# Patient Record
Sex: FEMALE | Race: WHITE | NOT HISPANIC OR LATINO | Employment: UNEMPLOYED | URBAN - METROPOLITAN AREA
[De-identification: names, ages, dates, MRNs, and addresses within clinical notes are randomized per-mention and may not be internally consistent; named-entity substitution may affect disease eponyms.]

---
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
## 2021-03-10 DIAGNOSIS — Z23 ENCOUNTER FOR IMMUNIZATION: ICD-10-CM

## 2021-08-02 ENCOUNTER — EVALUATION (OUTPATIENT)
Dept: PHYSICAL THERAPY | Facility: CLINIC | Age: 21
End: 2021-08-02
Payer: COMMERCIAL

## 2021-08-02 DIAGNOSIS — R53.1 DECREASED STRENGTH: ICD-10-CM

## 2021-08-02 DIAGNOSIS — Z74.09 IMPAIRED FUNCTIONAL MOBILITY, BALANCE, AND ENDURANCE: Primary | ICD-10-CM

## 2021-08-02 PROCEDURE — 97162 PT EVAL MOD COMPLEX 30 MIN: CPT

## 2021-08-02 PROCEDURE — 97112 NEUROMUSCULAR REEDUCATION: CPT

## 2021-08-03 NOTE — PROGRESS NOTES
PT Evaluation     Today's date: 2021  Patient name: Brandon Hernandez  : 2000  MRN: [id number]  Referring provider: Bety Alfredo MD  Dx:   Encounter Diagnosis     ICD-10-CM    1  Impaired functional mobility, balance, and endurance  Z74 09    2  Decreased strength  R53 1                 Daisy's mother answered her Covid Symptom Screening questionnaire  Daisy presented with a temperature WNL according to temporal thermometer  History and Current Information:  Client is a 22year old female who presents to PT due to a primary diagnosis of Down's Syndrome  Daisy previous was receiving skilled physical therapy services on an episode of care basis, but has not been seen in person since prior to the pandemic  Secondary to associated global hypotonia and ligamentous laxity, client has significant instability, balance deficits, impaired efficiency of movement, and impaired endurance  Client wears bilateral custom UCBL orthotics to control foot positioning  Client is s/p bilateral hemiepiphysiodesis with eight plate on distal medial femurs for correction of genu valgum and bilateral medial malleolar screw hemiepiphysiodesis on 7/15/2013  She is also s/p eight plate and screw removal in bilateral knees on 2014  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) and visual deficits which cause her to have double vision when her head is in midline  Client is followed by an optometrist, Dr Javier Marsh  Client is currently employed at [employer], working 6 hour shifts  She will attend [employer] two days per week beginning in the fall  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, working 4 hour shifts  Besides working and attending school, Annabella Hirkatelynn maintains a busy lifestyle  She is currently enrolled in a dance program and also attends the [employer] with her parents 2-3x per week   Daisy's family's goal for physical therapy is for Daisy to have improved endurance and stamina so that she can participate more effectively in her busy work/school schedule  Motor Abilities: Client can walk independently on indoor and outdoor surfaces  She requires increased time on uneven or unstable terrain  Client is independent with bed mobility and transfers  She can navigate stairs independently but does require increased time  Range of Motion:  - UE and LE ROM grossly WNL  - Cervical AROM: R lateral cervical flexion @ 45 degrees, L lateral cervical flexion at 30 degrees (deficit), R cervical rotation at 70 degrees (deficit), L cervical rotation at 100 degrees  - Cervical PROM: R lateral cervical flexion @ 45 degrees, L lateral cervical flexion at 40 degrees (deficit), R cervical rotation at 80 degrees (deficit), L cervical rotation at 100 degrees     Strength: Not formally assessed with MMT due to intellectual disability  - Impaired dorsiflexion strength bilaterally leading to decreased consistency of heel strike and occasionally decreased clearance during swing phase, causing shuffling  - Impaired strength of quadriceps (concentric and eccentric)   - Frequent knee hyperextension in closed chain positions for stability    - Impaired ability to lower herself down during transitions    - Descending stairs with reciprocal patterning and no HR: 40 seconds (regression from 19 seconds)  - Plank max hold 15 seconds (regression from 40 seconds)    Balance:  - SLS <1 second bilaterally (regresion from L 4 seconds, R 3 seconds)  - Tandem stance max hold R leading 26 seconds, L leading 30 seconds (improved from 20 seconds)   - Excessive lumbar lordosis noted due to decreased core activation  - 4 inch wide balance beam  Unable to cross without step-down (regression from 75% successful reps)    Characteristics of Posture and Movement:   Static Standing: client presents with severe calcaneal valgus with midfoot pronation and navicular head drop   She has a tendency towards a wider LUCAS with slight external rotation to increase stability  She presents with bilateral knee hyperextension and moderate genu valgum  Hips remain slightly flexed with forward trunk posturing leading to increased cervical extension  Client also maintains a 30-40 degree head tilt to the right at all times secondary to continued torticolis  Seated Posture: client prefers ring sitting on the floor  Decreased lumbar lordosis, forward rounded shoulders with cervical spine in excessive extension  Transitions: Client completes floor-to-standing transitions through half-kneel with lead hip abducted and externally rotated to allow for room to place both hands on the ground to assist with transition  Client can technically complete half-kneel to stand without use of UEs, but does not independently choose to do so  Gait Analysis: Client postures with forward trunk positioning and increased cervical extension throughout the gait cycle  She lacks upper trunk rotation leading to muted arm swing  Inconsistent heel strike, with flat foot initial contact often leading to shuffling gait patterning  Increased frequency of knee hyperextension at midstance  Standardized Testing:  Six Minute Walk Test: 438 meters completed (should be 664 (+/-49))    Rios Balance Scale: Total Score 50  Item: Score Comments   Sit to Stand 4    Standing Unsupported 4    Sitting with Back Unsupported but Feet Supported 4    Standing to Sitting 4    Transfers 4    Standing Unsupported with Eyes Closed 4    Standing Unsupported with Feet Together 4    Reaching Forward with Outstretched Arm while Standing 3 8 inch reach    Object from Floor from Standing Position 4    Turning to Look Behind Over Shoulder while Standing 3    Turn 360 Degrees 4    Place Alternate Foot on Step or Stool while Standing 4    Standing Unsupported with One foot in Front 3 R tandem 26 second hold, L tandem 30 second hold   Unable to assume tandem position independently   Stand on One Leg 1 Less than 2 second hold with each LE     Impairments and Participation Limitations:  - Impaired efficiency of movement and gait  - Impaired transitions without use of UEs  - Impaired balance on narrow surfaces  - Decreased strength leading to compensatory strategies, causing increased strain  - Impaired endurance and stamina leading to frequent rest breaks or decreased participation   - Poor posture both statically and dynamically    Summary and Recommendations: Daisy returns to physical therapy today following a 17 month hiatus secondary to the pandemic  While Daisy does maintain a busy schedule, her ability to participate is often limited by decreased endurance and strength  Daisy's poor foot and ankle alignment is adjusted closer to a more neutral position with her UCBLs  Her midfoot is sufficiently supported with the orthotics  However, Daisy's calcaneus remains in a valgus positioning bilaterally  Therapist is recommending that Daisy return to see her orthotist to see if a small amount of medial wedging would help to adjust the alignment of her calcanei to be closer to neutral  Daisy remains at a high risk for medial knee pain and orthopedic damage secondary to increased pressure placed medially due to her foot pronation and the valgus present at her knees  As seen above, Daisy has had some major regressions with regards to balance and stability  Her impaired endurance and strength is affecting her ability to successfully participate in ADLs, work requirements, and community outings  Secondary to these above stated deficits, Daisy will benefit from skilled physical therapy services at a frequency of 1x per week on an episode of care basis  It is imperative that Daisy receives these services, as she is at high risk for further regression secondary to her moderate hypotonia       GOALS:  1  Client will improve maximum plank time to 60 seconds while maintaining proper core and pelvic alignment to indicate appropriate level of strength needed to participate in higher level gross motor activities with proper postural integrity  Current Status: Maintains plank for 15 seconds but struggles to maintain proper spinal alignment  2  Client will demonstrate improved coordination by running 100 yards while dribbling basketball in under 45 seconds  Current Status: Client can walk 100 yards while dribbling basketball without losing control but cannot successfully complete 100 yards while running and dribbling  3  Client will maintain SLS for 8+ seconds bilaterally to indicate improved lateral hip complex strength and improved lateral weight shifting  Current Status: Client can maintain SLS for less than 1 second   4  Client will achieve full active and passive cervical range of motion to assist with maintenance of midline head posturing  Current Status: Client lacks 20 degrees passively and 30 degrees actively with regards to right cervical rotation  5  Client will achieve 600 meters during a Six Minute Walk Test to demonstrate improved gait efficiency and endurance     Current Status: Completes 438 meters

## 2021-08-09 ENCOUNTER — OFFICE VISIT (OUTPATIENT)
Dept: PHYSICAL THERAPY | Facility: CLINIC | Age: 21
End: 2021-08-09
Payer: COMMERCIAL

## 2021-08-09 DIAGNOSIS — R53.1 DECREASED STRENGTH: ICD-10-CM

## 2021-08-09 DIAGNOSIS — Z74.09 IMPAIRED FUNCTIONAL MOBILITY, BALANCE, AND ENDURANCE: Primary | ICD-10-CM

## 2021-08-09 PROCEDURE — 97112 NEUROMUSCULAR REEDUCATION: CPT

## 2021-08-09 PROCEDURE — 97110 THERAPEUTIC EXERCISES: CPT

## 2021-08-10 NOTE — PROGRESS NOTES
Daily Note     Today's date: 2021   Patient name: Judson Cm      : 2000       Age: 22 y o        School/Grade: 10th  MRN: [id number]  Referring provider: Alisson Mc MD  Dx:   Encounter Diagnosis     ICD-10-CM    1  Impaired functional mobility, balance, and endurance  Z74 09    2  Decreased strength  R53 1                 Daisy's mom answered the Covid Symptom Screening questions prior to our session  Daisy presented with a temperature WNL according to temporal thermometer  Hx: Client is a 22year old female who presents to PT due to a primary diagnosis of Down's Syndrome  Secondary to associated global hypotonia and ligamentous laxity, client has significant instability and balance deficits  Client wears bilateral custom UCBL orthotics to control foot positioning  Client is s/p bilateral hemiepiphysiodesis with eight plate on distal medial femurs for correction of genu valgum and bilateral medial malleolar screw hemiepiphysiodesis on 7/15/2013  She is also s/p eight plate and screw removal in bilateral knees on 2014  Client presents with impaired strength and endurance, affecting her successful participation in ADLs, school, and work  Subjective: Daisy arrived for session today accompanied by her mother while wearing her sneakers and UCBLs  Mom reports that Daisy has an appointment at the end of the month with her orthotist to make suggested adjustments to her UCBLs  No new concerns to report at this time       Objective:  - Quadruped kick backs with therapist assistance for proper alignment of LE  Cues to point toes to ground  2x10 reps  - Knee planks 4x20 seconds  - Long sitting dorsiflexion with red theraband 2x10 reps with each LE  - Squats to second step with focus on tall upright posturing and proper alignment of LEs 2x10 reps  - Ascending stairs with hip extension kick back  One handrail  x2 reps Mauricio for alignment  - Half-kneel to standing without use of UEs   x8 attempts with each LE  No successful reps (regression)    Assessment: Therapist provided Daisy and her mother with a written home exercise program, designed to augment our sessions and to focus in on improving strength and stability  Most of our session was spent reviewing exercises with a focus on proper alignment and posture  Secondary to her significant strength impairments throughout her core and hip complexes, Daisy presented with impaired form during many of our exercises today  With the quadruped kick backs, she consistently externally rotated her pelvis and hip, leading to very little gluteus abraham activation  She had the same tendencies during the standing kick-backs on the stairs  While working in the plank position, Daisy struggled to prevent lumbar lordosis, dropping into that position due to significantly impaired core strength  While completing squats on the steps, Daisy compensated for impaired quadriceps and gluteal strength by consistently assuming forward flexion of her trunk, utilizing hip flexors to help her lower down  It is imperative that we continue to address these strength deficits, as Daisy is placing increased strain on her joint complexes by utilizing these compensatory strategies  She is at high risk for injury or pain over time due to her hypotonic nature and ligamentous laxity  Plan: Daisy will continue to benefit from skilled physical therapy on an episodes of care basis to focus on improving strength, endurance, balance, and coordination so that she can have improved participation in ADLs, school, work, and community activities

## 2021-08-16 ENCOUNTER — OFFICE VISIT (OUTPATIENT)
Dept: PHYSICAL THERAPY | Facility: CLINIC | Age: 21
End: 2021-08-16
Payer: COMMERCIAL

## 2021-08-16 DIAGNOSIS — R53.1 DECREASED STRENGTH: ICD-10-CM

## 2021-08-16 DIAGNOSIS — Z74.09 IMPAIRED FUNCTIONAL MOBILITY, BALANCE, AND ENDURANCE: Primary | ICD-10-CM

## 2021-08-16 PROCEDURE — 97110 THERAPEUTIC EXERCISES: CPT

## 2021-08-16 PROCEDURE — 97140 MANUAL THERAPY 1/> REGIONS: CPT

## 2021-08-16 PROCEDURE — 97112 NEUROMUSCULAR REEDUCATION: CPT

## 2021-08-16 NOTE — PROGRESS NOTES
Daily Note     Today's date: 2021   Patient name: Tesha Beal      : 2000       Age: 22 y o        School/Grade: 10th  MRN: [id number]  Referring provider: Faisal Yu MD  Dx:   Encounter Diagnosis     ICD-10-CM    1  Impaired functional mobility, balance, and endurance  Z74 09    2  Decreased strength  R53 1                 Daisy's mom answered the Covid Symptom Screening questions prior to our session  Daisy presented with a temperature WNL according to temporal thermometer  Hx: Client is a 22year old female who presents to PT due to a primary diagnosis of Down's Syndrome  Secondary to associated global hypotonia and ligamentous laxity, client has significant instability and balance deficits  Client wears bilateral custom UCBL orthotics to control foot positioning  Client is s/p bilateral hemiepiphysiodesis with eight plate on distal medial femurs for correction of genu valgum and bilateral medial malleolar screw hemiepiphysiodesis on 7/15/2013  She is also s/p eight plate and screw removal in bilateral knees on 2014  Client presents with impaired strength and endurance, affecting her successful participation in ADLs, school, and work  Subjective: Daisy arrived for session today accompanied by her mother while wearing her sneakers and UCBLs  Mom reports that Daisy worked on her HEP this week but had a very difficult time with holding hip extension in quadruped  She has difficulty with getting her LE up high enough and with avoiding external rotation       Objective:  - NuStep seated elliptical x5 minutes  Level 4   - Quadruped position extending opposite UE/LE 4x10 seconds on each side  - Knee planks 4x20 seconds  - Ascending stairs with hip extension kick back  One handrail  x2 reps Mauricio for alignment  - Half-kneel to standing without use of UEs  x6 attempts with each LE  75% success, improved from no successful reps last week    - Standing balance in semi-tandem with lead foot on Bosu  Reaching forward into a semi-lunge position to gather cone from table x8 reps with each LE leading  3 LOBs  - Standing cervical retraction holds 4x10 seconds  - Manual stretching into left lateral cervical flexion and right rotation    Assessment: Daisy worked very hard throughout our session today  In her efforts to complete exercises to the best of her ability, Daisy presents with poor quality of movement at times  She presents with increased external rotation of her LEs to help improve balance while in a narrow LUCAS, such as during semi-tandem stance  She lacks the lateral and posterior hip and core strength to maintain stability when positioned in a narrow base  This is a safety concern, as Daisy also lacks sufficient balance reactions at times  Daisy struggled to prevent lumbar lordosis while in the knee plank position secondary to impaired strength and also impaired awareness of posturing  During all hip extension movement patterns today, Daisy consistently externally rotated her pelvis and hip, utilizing lateral hip musculature instead of gluteus abraham secondary to continued strength deficits of her hip extensors  While attempting cervical retraction, Daisy struggled to understand the appropriate movement and muscular activation, and often ended up in increased extension  Overall, Daisy has shown the ability to make progress over time within our sessions and also with a detailed HEP  However, it is imperative that she practices these exercises extensively with therapist in order to avoid improper technique and potential for injury  Plan: Daisy will continue to benefit from skilled physical therapy on an episodes of care basis to focus on improving strength, endurance, balance, and coordination so that she can have improved participation in ADLs, school, work, and community activities

## 2021-08-23 ENCOUNTER — OFFICE VISIT (OUTPATIENT)
Dept: PHYSICAL THERAPY | Facility: CLINIC | Age: 21
End: 2021-08-23
Payer: COMMERCIAL

## 2021-08-23 DIAGNOSIS — R53.1 DECREASED STRENGTH: ICD-10-CM

## 2021-08-23 DIAGNOSIS — Z74.09 IMPAIRED FUNCTIONAL MOBILITY, BALANCE, AND ENDURANCE: Primary | ICD-10-CM

## 2021-08-23 PROCEDURE — 97110 THERAPEUTIC EXERCISES: CPT

## 2021-08-23 PROCEDURE — 97112 NEUROMUSCULAR REEDUCATION: CPT

## 2021-08-24 NOTE — PROGRESS NOTES
Daily Note     Today's date: 2021   Patient name: Tesha Beal      : 2000       Age: 22 y o        School/Grade: 10th  MRN: [id number]  Referring provider: Faisal Yu MD  Dx:   Encounter Diagnosis     ICD-10-CM    1  Impaired functional mobility, balance, and endurance  Z74 09    2  Decreased strength  R53 1                 Daisy's mom answered the Covid Symptom Screening questions prior to our session  Daisy presented with a temperature WNL according to temporal thermometer  Hx: Client is a 22year old female who presents to PT due to a primary diagnosis of Down's Syndrome  Secondary to associated global hypotonia and ligamentous laxity, client has significant instability and balance deficits  Client wears bilateral custom UCBL orthotics to control foot positioning  Client is s/p bilateral hemiepiphysiodesis with eight plate on distal medial femurs for correction of genu valgum and bilateral medial malleolar screw hemiepiphysiodesis on 7/15/2013  She is also s/p eight plate and screw removal in bilateral knees on 2014  Client presents with impaired strength and endurance, affecting her successful participation in ADLs, school, and work  Subjective: Daisy arrived for session today accompanied by her mother while wearing her sneakers and UCBLs  Daisy will be seen by her orthotist next week to have adjustments made, as we are trying to achieve improved calcaneal alignment   Mom with no new concerns to report at this time       Objective:  - Treadmill walking 2 0 mph x5 minutes  - Knee planks 4x20 seconds  - Modified lunge position over bolster 2x8 reps with each LE leading  - Sit-to-stand and stand-to-sit transitions from low bench with focus on proper alignment and eccentric muscular control  - Hip abduction machine at 30# with focus on slow eccentric lowering  - Standing heel raises in midline alignment  - Standing cervical retraction holds 4x10 seconds  - Manual stretching into left lateral cervical flexion and right rotation    Assessment: Daisy continues to present with a 30 degree head tilt to the right at all times  She is able to adjust to neutral with verbal cues but does not maintain midline secondary to visual deficits, which make it easier for her to see with a head tilt  Therapist will continue to help Daisy to maintain range in her right-sided cervical musculature, but without consistent visual therapy exercises, Daisy cannot maintain midline at this time  Therefore, we continue to focus our efforts elsewhere to help Daisy achieve her physical therapy goals  Daisy showed improved trunk alignment during our knee planks today  We will continue to complete the exercise on her knees, as she is achieving more effective abdominal musculature activation with more control over her lumbar positioning, leading to a safer exercise  Throughout the session today, the majority of our focus was on improving alignment and positioning while completing strengthening exercises  Due to strength imbalances throughout her hip complexes, Daisy has a preference for wide LUCAS and increased external rotation  This places increased strain on her joint complexes throughout her LEs and can lead to pain over time  We continue to practice exercises during our sessions and add them to Daisy's HEP as she is able to complete them with proper technique and alignment  Plan: Daisy will continue to benefit from skilled physical therapy on an episodes of care basis to focus on improving strength, endurance, balance, and coordination so that she can have improved participation in ADLs, school, work, and community activities

## 2021-09-13 ENCOUNTER — OFFICE VISIT (OUTPATIENT)
Dept: PHYSICAL THERAPY | Facility: CLINIC | Age: 21
End: 2021-09-13
Payer: COMMERCIAL

## 2021-09-13 DIAGNOSIS — Z74.09 IMPAIRED FUNCTIONAL MOBILITY, BALANCE, AND ENDURANCE: Primary | ICD-10-CM

## 2021-09-13 DIAGNOSIS — R53.1 DECREASED STRENGTH: ICD-10-CM

## 2021-09-13 PROCEDURE — 97110 THERAPEUTIC EXERCISES: CPT

## 2021-09-13 PROCEDURE — 97112 NEUROMUSCULAR REEDUCATION: CPT

## 2021-09-13 PROCEDURE — 97140 MANUAL THERAPY 1/> REGIONS: CPT

## 2021-09-14 NOTE — PROGRESS NOTES
Daily Note     Today's date: 2021   Patient name: Freida Vences      : 2000       Age: 22 y o        School/Grade: 10th  MRN: [id number]  Referring provider: Yoselin Collins MD  Dx:   Encounter Diagnosis     ICD-10-CM    1  Impaired functional mobility, balance, and endurance  Z74 09    2  Decreased strength  R53 1                 Daisy's mom answered the Covid Symptom Screening questions prior to our session  Daisy presented with a temperature WNL according to temporal thermometer  Hx: Client is a 22year old female who presents to PT due to a primary diagnosis of Down's Syndrome  Secondary to associated global hypotonia and ligamentous laxity, client has significant instability and balance deficits  Client wears bilateral custom UCBL orthotics to control foot positioning  Client is s/p bilateral hemiepiphysiodesis with eight plate on distal medial femurs for correction of genu valgum and bilateral medial malleolar screw hemiepiphysiodesis on 7/15/2013  She is also s/p eight plate and screw removal in bilateral knees on 2014  Client presents with impaired strength and endurance, affecting her successful participation in ADLs, school, and work  Subjective: Daisy arrived for session today accompanied by her mother while wearing her sneakers and UCBLs  Daisy had her orthotics adjusted by Kwesi Perales at [address] last week and got new sneakers as well  She has not had any complaints about the adjustments made to her UCBLs   She has not had any skin irritations on her feet either       Objective:  - Therapist examination of orthotics and alignment  - Treadmill walking 2 2 mph x5 minutes  - Knee planks 4x20 seconds  - Sit-to-stand and stand-to-sit transitions from low bench with focus on proper alignment and eccentric muscular control  - Hip abduction machine at 30# with focus on slow eccentric lowering  - Half-kneel to standing transitions x6 with each LE leading  - Quadruped extending opposite UE/LE 4x10 seconds on each side  - Standing cervical retraction holds 4x10 seconds  - Manual stretching into left lateral cervical flexion and right rotation    Assessment: The orthotist added a small arch fill and slight about of medial wedging (about 1/8 inch) on each UCBL  Therapist does note slight improvements to Daisy's calcaneal alignment  However, perhaps slightly more padding could help Daisy to achieve closer to neutral alignment  Therapist will see how Daisy responds for a few weeks to the recent changes made by the orthotist to her UCBLs  If she responds well, then we might consider adding slightly to the medial wedge  Daisy tolerated treadmill training without getting upset today  This allowed therapist time to analyze her gait more effectively  Daisy continues to present with decreased dorsiflexion throughout the swing phase leading to a flat footed initial contact  This also leads to more of a shuffling gait pattern with decreased overall clearance  She also has bilateral knee hyperextension at midstance secondary to impaired quadriceps strength, leading to reliance on locking out at her knee for stability  Daisy presents with forward trunk posturing, increased cervical hyperextension, and a severe head tip to the right  We continue to work on improving posture and alignment both statically and dynamically during exercises so that Daisy can have improved opportunities for strengthening  She showed slight improvements in her ability to get out of external rotation and into more hip extension while working in quadruped today  However, secondary to strength impairments, she is not able to hold that proper alignment for more than 5 seconds at a time  At this time, Rebel Paul continues to require skilled physical therapy in order to help her address her strength deficits, gait impairments, and impaired participation       Plan: Rebel Paul will continue to benefit from skilled physical therapy on an episodes of care basis to focus on improving strength, endurance, balance, and coordination so that she can have improved participation in ADLs, school, work, and community activities  Addendum: Client is unable to attend therapy on the day/time offered by therapist  At this time, family has chosen to take a break from skilled physical therapy services  They would potential like to resume services in the future, depending on scheduling  Please see most recent evaluation on 08/02/2021 for details regarding Daisy's level of functioning  The following were her most recent physical therapy goals:    PT GOALS:  1  Client will improve maximum plank time to 60 seconds while maintaining proper core and pelvic alignment to indicate appropriate level of strength needed to participate in higher level gross motor activities with proper postural integrity  Current Status: Maintains plank for 15 seconds but struggles to maintain proper spinal alignment  2  Client will demonstrate improved coordination by running 100 yards while dribbling basketball in under 45 seconds  Current Status: Client can walk 100 yards while dribbling basketball without losing control but cannot successfully complete 100 yards while running and dribbling  3  Client will maintain SLS for 8+ seconds bilaterally to indicate improved lateral hip complex strength and improved lateral weight shifting  Current Status: Client can maintain SLS for less than 1 second   4  Client will achieve full active and passive cervical range of motion to assist with maintenance of midline head posturing  Current Status: Client lacks 20 degrees passively and 30 degrees actively with regards to right cervical rotation  5  Client will achieve 600 meters during a Six Minute Walk Test to demonstrate improved gait efficiency and endurance     Current Status: Completes 438 meters

## 2025-04-25 ENCOUNTER — OFFICE VISIT (OUTPATIENT)
Dept: FAMILY MEDICINE CLINIC | Facility: CLINIC | Age: 25
End: 2025-04-25
Payer: COMMERCIAL

## 2025-04-25 VITALS
WEIGHT: 168 LBS | DIASTOLIC BLOOD PRESSURE: 70 MMHG | HEART RATE: 87 BPM | OXYGEN SATURATION: 96 % | SYSTOLIC BLOOD PRESSURE: 118 MMHG | HEIGHT: 60 IN | TEMPERATURE: 98.5 F | RESPIRATION RATE: 16 BRPM | BODY MASS INDEX: 32.98 KG/M2

## 2025-04-25 DIAGNOSIS — Z00.00 HEALTHCARE MAINTENANCE: Primary | ICD-10-CM

## 2025-04-25 DIAGNOSIS — E78.49 OTHER HYPERLIPIDEMIA: ICD-10-CM

## 2025-04-25 PROBLEM — R01.1 HEART MURMUR: Status: ACTIVE | Noted: 2025-04-25

## 2025-04-25 PROBLEM — E78.5 DYSLIPIDEMIA: Status: ACTIVE | Noted: 2022-04-16

## 2025-04-25 PROBLEM — R41.89 COGNITIVE IMPAIRMENT: Status: ACTIVE | Noted: 2017-09-19

## 2025-04-25 PROBLEM — E78.5 DYSLIPIDEMIA: Status: RESOLVED | Noted: 2022-04-16 | Resolved: 2025-04-25

## 2025-04-25 PROCEDURE — 99385 PREV VISIT NEW AGE 18-39: CPT | Performed by: FAMILY MEDICINE

## 2025-04-25 RX ORDER — NORGESTIMATE AND ETHINYL ESTRADIOL 0.25-0.035
1 KIT ORAL DAILY
COMMUNITY

## 2025-04-25 NOTE — ASSESSMENT & PLAN NOTE
Not well-controlled.  It was discussed about low-fat diet.  We will continue to monitor fasting lipid profile.  Orders:  •  Lipid Panel with Direct LDL reflex; Future

## 2025-04-25 NOTE — ASSESSMENT & PLAN NOTE
It was discussed about immunizations she is up-to-date for her vaccine.  Discussed about nutrition and safety measures.  Orders:  •  CBC and differential; Future  •  Comprehensive metabolic panel; Future  •  Lipid Panel with Direct LDL reflex; Future  •  Hemoglobin A1C; Future  •  TSH, 3rd generation with Free T4 reflex; Future

## 2025-04-25 NOTE — PROGRESS NOTES
Name: Daisy Merida      : 2000      MRN: [id number]  Encounter Provider: Lian Welch MD  Encounter Date: 2025   Encounter department: [facility]  :  Assessment & Plan  Healthcare maintenance  It was discussed about immunizations she is up-to-date for her vaccine.  Discussed about nutrition and safety measures.  Orders:  •  CBC and differential; Future  •  Comprehensive metabolic panel; Future  •  Lipid Panel with Direct LDL reflex; Future  •  Hemoglobin A1C; Future  •  TSH, 3rd generation with Free T4 reflex; Future    Other hyperlipidemia  Not well-controlled.  It was discussed about low-fat diet.  We will continue to monitor fasting lipid profile.  Orders:  •  Lipid Panel with Direct LDL reflex; Future           History of Present Illness   She is here today with her mother as a new patient for wellness exam.  She has history of Down syndrome.  Family history of diabetes mellitus type 2.  Her last blood work showed high cholesterol.      Review of Systems   Constitutional:  Negative for chills and fever.   HENT:  Negative for trouble swallowing.    Eyes:  Negative for visual disturbance.   Respiratory:  Negative for cough and shortness of breath.    Cardiovascular:  Negative for chest pain, palpitations and leg swelling.   Gastrointestinal:  Negative for abdominal pain, constipation and diarrhea.   Endocrine: Negative for cold intolerance and heat intolerance.   Genitourinary:  Negative for difficulty urinating and dysuria.   Musculoskeletal:  Negative for gait problem.   Skin:  Negative for rash.   Neurological:  Negative for dizziness, tremors, seizures and headaches.   Hematological:  Negative for adenopathy.   Psychiatric/Behavioral:  Negative for behavioral problems.        Objective   /70 (BP Location: Left arm, Patient Position: Sitting, Cuff Size: Adult)   Pulse 87   Temp 98.5 °F (36.9 °C) (Tympanic)   Resp 16   Ht 5' (1.524 m)   Wt 76.2 kg (168  lb)   SpO2 96%   BMI 32.81 kg/m²      Physical Exam  Vitals and nursing note reviewed.   Constitutional:       Appearance: She is well-developed.   HENT:      Head: Normocephalic and atraumatic.   Eyes:      Pupils: Pupils are equal, round, and reactive to light.   Cardiovascular:      Rate and Rhythm: Normal rate and regular rhythm.      Heart sounds: Murmur heard.   Pulmonary:      Effort: Pulmonary effort is normal.      Breath sounds: Normal breath sounds.   Abdominal:      General: Bowel sounds are normal.      Palpations: Abdomen is soft.   Musculoskeletal:      Cervical back: Normal range of motion and neck supple.   Lymphadenopathy:      Cervical: No cervical adenopathy.   Skin:     General: Skin is warm.   Neurological:      Mental Status: She is alert. Mental status is at baseline.

## 2025-05-25 PROBLEM — Z00.00 HEALTHCARE MAINTENANCE: Status: RESOLVED | Noted: 2025-04-25 | Resolved: 2025-05-25

## 2025-06-24 ENCOUNTER — OFFICE VISIT (OUTPATIENT)
Dept: URGENT CARE | Age: 25
End: 2025-06-24
Payer: COMMERCIAL

## 2025-06-24 VITALS
OXYGEN SATURATION: 97 % | HEART RATE: 77 BPM | BODY MASS INDEX: 32.81 KG/M2 | WEIGHT: 168 LBS | RESPIRATION RATE: 18 BRPM | TEMPERATURE: 98.7 F

## 2025-06-24 DIAGNOSIS — J02.9 SORE THROAT: Primary | ICD-10-CM

## 2025-06-24 DIAGNOSIS — H65.91 RIGHT NON-SUPPURATIVE OTITIS MEDIA: ICD-10-CM

## 2025-06-24 LAB — S PYO AG THROAT QL: NEGATIVE

## 2025-06-24 PROCEDURE — 99213 OFFICE O/P EST LOW 20 MIN: CPT

## 2025-06-24 PROCEDURE — 87880 STREP A ASSAY W/OPTIC: CPT

## 2025-06-24 RX ORDER — AMOXICILLIN 500 MG/1
500 CAPSULE ORAL EVERY 12 HOURS SCHEDULED
Qty: 20 CAPSULE | Refills: 0 | Status: SHIPPED | OUTPATIENT
Start: 2025-06-24 | End: 2025-07-04

## 2025-06-24 RX ORDER — PREDNISONE 20 MG/1
40 TABLET ORAL DAILY
Qty: 8 TABLET | Refills: 0 | Status: SHIPPED | OUTPATIENT
Start: 2025-06-24 | End: 2025-06-28

## 2025-06-24 NOTE — PROGRESS NOTES
[facility]        NAME: Daisy Merida is a 24 y.o. female  : 2000    MRN: [id number]  DATE: 2025  TIME: 6:53 PM    Assessment and Plan   Sore throat [J02.9]  1. Sore throat  POCT rapid ANTIGEN strepA    predniSONE 20 mg tablet      2. Right non-suppurative otitis media  amoxicillin (AMOXIL) 500 mg capsule        Sore scratchy throat, congestion, no fevers. Voice change. Rapid strep neg- right ear erythematous.     Patient Instructions       Follow up with PCP in 3-5 days.  Proceed to  ER if symptoms worsen.    If tests have been performed at [facility], our office will contact you with results if changes need to be made to the care plan discussed with you at the visit.  You can review your full results on [facility].    Chief Complaint     Chief Complaint   Patient presents with    Sore Throat     Started last week after returning from [place] with sore throat, sneezing.  Denies fevers, cough, congestion.  Taking Aleve and Tylenol, last dose approx 4:30.           History of Present Illness       Sore scratchy throat, congestion, no fevers. Voice change. Rapid strep neg- right ear erythematous.     Sore Throat   Associated symptoms include congestion. Pertinent negatives include no coughing, shortness of breath or trouble swallowing.       Review of Systems   Review of Systems   Constitutional:  Negative for fever.   HENT:  Positive for congestion, postnasal drip and sore throat. Negative for trouble swallowing.    Respiratory:  Negative for cough, shortness of breath and wheezing.    All other systems reviewed and are negative.        Current Medications     Current Medications[1]    Current Allergies     Allergies as of 2025    (No Known Allergies)            The following portions of the patient's history were reviewed and updated as appropriate: allergies, current medications, past family history, past medical history, past social history, past surgical history and problem  list.     Past Medical History[2]    Past Surgical History[3]    Family History[4]      Medications have been verified.        Objective   Pulse 77   Temp 98.7 °F (37.1 °C)   Resp 18   Wt 76.2 kg (168 lb)   SpO2 97%   BMI 32.81 kg/m²   No LMP recorded.       Physical Exam     Physical Exam  Vitals reviewed.   Constitutional:       Appearance: She is well-developed.   HENT:      Right Ear: A middle ear effusion is present. Tympanic membrane is erythematous.      Left Ear: Tympanic membrane normal.      Nose: Congestion and rhinorrhea present.      Mouth/Throat:      Pharynx: No pharyngeal swelling, oropharyngeal exudate or posterior oropharyngeal erythema.      Tonsils: No tonsillar exudate.     Cardiovascular:      Rate and Rhythm: Normal rate and regular rhythm.   Pulmonary:      Effort: Pulmonary effort is normal.      Breath sounds: Normal breath sounds.   Lymphadenopathy:      Cervical: No cervical adenopathy.     Neurological:      Mental Status: She is alert.                        [1]   Current Outpatient Medications:     amoxicillin (AMOXIL) 500 mg capsule, Take 1 capsule (500 mg total) by mouth every 12 (twelve) hours for 10 days, Disp: 20 capsule, Rfl: 0    norgestimate-ethinyl estradiol (ORTHO-CYCLEN) 0.25-35 MG-MCG per tablet, Take 1 tablet by mouth in the morning., Disp: , Rfl:     predniSONE 20 mg tablet, Take 2 tablets (40 mg total) by mouth daily for 4 days, Disp: 8 tablet, Rfl: 0  [2]   Past Medical History:  Diagnosis Date    Down syndrome    [3]   Past Surgical History:  Procedure Laterality Date    ANKLE SURGERY      KNEE SURGERY     [4]   Family History  Problem Relation Name Age of Onset    Hypertension Mother      Hyperlipidemia Father      Diabetes Father      Hypertension Father